# Patient Record
Sex: MALE
[De-identification: names, ages, dates, MRNs, and addresses within clinical notes are randomized per-mention and may not be internally consistent; named-entity substitution may affect disease eponyms.]

---

## 2017-07-25 ENCOUNTER — CHARTING TRANS (OUTPATIENT)
Dept: OTHER | Age: 22
End: 2017-07-25

## 2017-08-09 ENCOUNTER — IMAGING SERVICES (OUTPATIENT)
Dept: OTHER | Age: 22
End: 2017-08-09

## 2017-08-09 ENCOUNTER — LAB SERVICES (OUTPATIENT)
Dept: OTHER | Age: 22
End: 2017-08-09

## 2017-08-09 ENCOUNTER — CHARTING TRANS (OUTPATIENT)
Dept: GASTROENTEROLOGY | Age: 22
End: 2017-08-09

## 2017-08-10 LAB
GLIADIN PEPTIDE IGA SER IA-ACNC: 2.5 U/ML (ref 0–7)
GLIADIN PEPTIDE IGG SER IA-ACNC: <0.4 U/ML (ref 0–7)
TTG IGA SER IA-ACNC: 0.7 U/ML (ref 0–7)
TTG IGG SER IA-ACNC: <0.6 U/ML (ref 0–7)

## 2017-08-18 ENCOUNTER — CHARTING TRANS (OUTPATIENT)
Dept: OTHER | Age: 22
End: 2017-08-18

## 2018-11-28 VITALS
HEIGHT: 71 IN | DIASTOLIC BLOOD PRESSURE: 52 MMHG | HEART RATE: 52 BPM | WEIGHT: 169 LBS | SYSTOLIC BLOOD PRESSURE: 96 MMHG | RESPIRATION RATE: 14 BRPM | BODY MASS INDEX: 23.66 KG/M2

## 2019-03-22 ENCOUNTER — APPOINTMENT (OUTPATIENT)
Dept: OTHER | Facility: HOSPITAL | Age: 24
End: 2019-03-22
Attending: EMERGENCY MEDICINE

## 2023-06-08 ENCOUNTER — OFFICE VISIT (OUTPATIENT)
Dept: FAMILY MEDICINE CLINIC | Facility: CLINIC | Age: 28
End: 2023-06-08
Payer: COMMERCIAL

## 2023-06-08 VITALS
WEIGHT: 220 LBS | OXYGEN SATURATION: 98 % | HEART RATE: 85 BPM | SYSTOLIC BLOOD PRESSURE: 108 MMHG | RESPIRATION RATE: 16 BRPM | BODY MASS INDEX: 31.5 KG/M2 | HEIGHT: 70 IN | DIASTOLIC BLOOD PRESSURE: 68 MMHG

## 2023-06-08 DIAGNOSIS — Z00.00 WELLNESS EXAMINATION: Primary | ICD-10-CM

## 2023-06-08 DIAGNOSIS — Z00.00 LABORATORY EXAMINATION ORDERED AS PART OF A ROUTINE GENERAL MEDICAL EXAMINATION: ICD-10-CM

## 2023-06-08 DIAGNOSIS — K21.9 GASTROESOPHAGEAL REFLUX DISEASE, UNSPECIFIED WHETHER ESOPHAGITIS PRESENT: ICD-10-CM

## 2023-06-08 PROCEDURE — 3078F DIAST BP <80 MM HG: CPT | Performed by: FAMILY MEDICINE

## 2023-06-08 PROCEDURE — G0438 PPPS, INITIAL VISIT: HCPCS | Performed by: FAMILY MEDICINE

## 2023-06-08 PROCEDURE — 3074F SYST BP LT 130 MM HG: CPT | Performed by: FAMILY MEDICINE

## 2023-06-08 PROCEDURE — 99385 PREV VISIT NEW AGE 18-39: CPT | Performed by: FAMILY MEDICINE

## 2023-06-08 PROCEDURE — 99204 OFFICE O/P NEW MOD 45 MIN: CPT | Performed by: FAMILY MEDICINE

## 2023-06-08 PROCEDURE — 3008F BODY MASS INDEX DOCD: CPT | Performed by: FAMILY MEDICINE

## 2023-06-08 RX ORDER — FAMOTIDINE 20 MG/1
20 TABLET, FILM COATED ORAL 2 TIMES DAILY PRN
COMMUNITY

## 2023-06-08 RX ORDER — PANTOPRAZOLE SODIUM 40 MG/1
40 TABLET, DELAYED RELEASE ORAL
Qty: 30 TABLET | Refills: 2 | Status: SHIPPED | OUTPATIENT
Start: 2023-06-08

## 2023-06-10 ENCOUNTER — LAB ENCOUNTER (OUTPATIENT)
Dept: LAB | Age: 28
End: 2023-06-10
Attending: FAMILY MEDICINE
Payer: COMMERCIAL

## 2023-06-10 DIAGNOSIS — Z00.00 LABORATORY EXAMINATION ORDERED AS PART OF A ROUTINE GENERAL MEDICAL EXAMINATION: ICD-10-CM

## 2023-06-10 DIAGNOSIS — K21.9 GASTROESOPHAGEAL REFLUX DISEASE, UNSPECIFIED WHETHER ESOPHAGITIS PRESENT: ICD-10-CM

## 2023-06-10 LAB
ALBUMIN SERPL-MCNC: 4 G/DL (ref 3.4–5)
ALBUMIN/GLOB SERPL: 1.1 {RATIO} (ref 1–2)
ALP LIVER SERPL-CCNC: 91 U/L
ALT SERPL-CCNC: 45 U/L
ANION GAP SERPL CALC-SCNC: 4 MMOL/L (ref 0–18)
AST SERPL-CCNC: 23 U/L (ref 15–37)
BASOPHILS # BLD AUTO: 0.04 X10(3) UL (ref 0–0.2)
BASOPHILS NFR BLD AUTO: 0.5 %
BILIRUB SERPL-MCNC: 0.8 MG/DL (ref 0.1–2)
BUN BLD-MCNC: 15 MG/DL (ref 7–18)
CALCIUM BLD-MCNC: 9 MG/DL (ref 8.5–10.1)
CHLORIDE SERPL-SCNC: 106 MMOL/L (ref 98–112)
CHOLEST SERPL-MCNC: 181 MG/DL (ref ?–200)
CO2 SERPL-SCNC: 26 MMOL/L (ref 21–32)
CREAT BLD-MCNC: 1.1 MG/DL
EOSINOPHIL # BLD AUTO: 0.12 X10(3) UL (ref 0–0.7)
EOSINOPHIL NFR BLD AUTO: 1.6 %
ERYTHROCYTE [DISTWIDTH] IN BLOOD BY AUTOMATED COUNT: 12.4 %
FASTING PATIENT LIPID ANSWER: YES
FASTING STATUS PATIENT QL REPORTED: YES
GFR SERPLBLD BASED ON 1.73 SQ M-ARVRAT: 93 ML/MIN/1.73M2 (ref 60–?)
GLOBULIN PLAS-MCNC: 3.5 G/DL (ref 2.8–4.4)
GLUCOSE BLD-MCNC: 95 MG/DL (ref 70–99)
HCT VFR BLD AUTO: 43.6 %
HDLC SERPL-MCNC: 35 MG/DL (ref 40–59)
HGB BLD-MCNC: 13.9 G/DL
IMM GRANULOCYTES # BLD AUTO: 0.06 X10(3) UL (ref 0–1)
IMM GRANULOCYTES NFR BLD: 0.8 %
LDLC SERPL CALC-MCNC: 119 MG/DL (ref ?–100)
LYMPHOCYTES # BLD AUTO: 1.98 X10(3) UL (ref 1–4)
LYMPHOCYTES NFR BLD AUTO: 25.7 %
MCH RBC QN AUTO: 29 PG (ref 26–34)
MCHC RBC AUTO-ENTMCNC: 31.9 G/DL (ref 31–37)
MCV RBC AUTO: 90.8 FL
MONOCYTES # BLD AUTO: 0.81 X10(3) UL (ref 0.1–1)
MONOCYTES NFR BLD AUTO: 10.5 %
NEUTROPHILS # BLD AUTO: 4.7 X10 (3) UL (ref 1.5–7.7)
NEUTROPHILS # BLD AUTO: 4.7 X10(3) UL (ref 1.5–7.7)
NEUTROPHILS NFR BLD AUTO: 60.9 %
NONHDLC SERPL-MCNC: 146 MG/DL (ref ?–130)
OSMOLALITY SERPL CALC.SUM OF ELEC: 283 MOSM/KG (ref 275–295)
PLATELET # BLD AUTO: 280 10(3)UL (ref 150–450)
POTASSIUM SERPL-SCNC: 3.7 MMOL/L (ref 3.5–5.1)
PROT SERPL-MCNC: 7.5 G/DL (ref 6.4–8.2)
RBC # BLD AUTO: 4.8 X10(6)UL
SODIUM SERPL-SCNC: 136 MMOL/L (ref 136–145)
TRIGL SERPL-MCNC: 150 MG/DL (ref 30–149)
TSI SER-ACNC: 3.01 MIU/ML (ref 0.36–3.74)
VLDLC SERPL CALC-MCNC: 26 MG/DL (ref 0–30)
WBC # BLD AUTO: 7.7 X10(3) UL (ref 4–11)

## 2023-06-10 PROCEDURE — 80053 COMPREHEN METABOLIC PANEL: CPT

## 2023-06-10 PROCEDURE — 80061 LIPID PANEL: CPT

## 2023-06-10 PROCEDURE — 85025 COMPLETE CBC W/AUTO DIFF WBC: CPT

## 2023-06-10 PROCEDURE — 84443 ASSAY THYROID STIM HORMONE: CPT

## 2023-06-10 PROCEDURE — 87338 HPYLORI STOOL AG IA: CPT

## 2023-06-10 PROCEDURE — 36415 COLL VENOUS BLD VENIPUNCTURE: CPT

## 2023-06-13 LAB — H PYLORI AG STL QL IA: NEGATIVE

## 2023-07-17 ENCOUNTER — OFFICE VISIT (OUTPATIENT)
Dept: FAMILY MEDICINE CLINIC | Facility: CLINIC | Age: 28
End: 2023-07-17
Payer: COMMERCIAL

## 2023-07-17 VITALS
OXYGEN SATURATION: 97 % | HEIGHT: 70 IN | HEART RATE: 66 BPM | RESPIRATION RATE: 16 BRPM | DIASTOLIC BLOOD PRESSURE: 82 MMHG | WEIGHT: 223 LBS | BODY MASS INDEX: 31.92 KG/M2 | SYSTOLIC BLOOD PRESSURE: 114 MMHG

## 2023-07-17 DIAGNOSIS — E78.2 MIXED HYPERLIPIDEMIA: ICD-10-CM

## 2023-07-17 DIAGNOSIS — K21.9 GASTROESOPHAGEAL REFLUX DISEASE, UNSPECIFIED WHETHER ESOPHAGITIS PRESENT: Primary | ICD-10-CM

## 2023-07-17 PROCEDURE — 3008F BODY MASS INDEX DOCD: CPT | Performed by: FAMILY MEDICINE

## 2023-07-17 PROCEDURE — 3079F DIAST BP 80-89 MM HG: CPT | Performed by: FAMILY MEDICINE

## 2023-07-17 PROCEDURE — 3074F SYST BP LT 130 MM HG: CPT | Performed by: FAMILY MEDICINE

## 2023-07-17 PROCEDURE — 99213 OFFICE O/P EST LOW 20 MIN: CPT | Performed by: FAMILY MEDICINE

## 2023-07-17 RX ORDER — FLUTICASONE PROPIONATE 50 MCG
2 SPRAY, SUSPENSION (ML) NASAL DAILY
COMMUNITY

## 2023-08-10 ENCOUNTER — LAB ENCOUNTER (OUTPATIENT)
Dept: LAB | Age: 28
End: 2023-08-10
Attending: STUDENT IN AN ORGANIZED HEALTH CARE EDUCATION/TRAINING PROGRAM
Payer: COMMERCIAL

## 2023-08-10 DIAGNOSIS — K58.0 IRRITABLE BOWEL SYNDROME WITH DIARRHEA: ICD-10-CM

## 2023-08-10 DIAGNOSIS — K92.1 MELENA: ICD-10-CM

## 2023-08-10 LAB — IGA SERPL-MCNC: 162 MG/DL (ref 70–312)

## 2023-08-10 PROCEDURE — 82784 ASSAY IGA/IGD/IGG/IGM EACH: CPT

## 2023-08-10 PROCEDURE — 86364 TISS TRNSGLTMNASE EA IG CLAS: CPT

## 2023-08-10 PROCEDURE — 36415 COLL VENOUS BLD VENIPUNCTURE: CPT

## 2023-08-11 LAB — TTG IGA SER-ACNC: 0.9 U/ML (ref ?–7)

## 2023-08-12 ENCOUNTER — LAB ENCOUNTER (OUTPATIENT)
Dept: LAB | Age: 28
End: 2023-08-12
Attending: INTERNAL MEDICINE
Payer: COMMERCIAL

## 2023-08-12 DIAGNOSIS — K58.0 IRRITABLE BOWEL SYNDROME WITH DIARRHEA: ICD-10-CM

## 2023-08-12 DIAGNOSIS — K92.1 MELENA: ICD-10-CM

## 2023-08-12 LAB
CRYPTOSP AG STL QL IA: NEGATIVE
G LAMBLIA AG STL QL IA: NEGATIVE

## 2023-08-12 PROCEDURE — 87272 CRYPTOSPORIDIUM AG IF: CPT

## 2023-08-12 PROCEDURE — 87329 GIARDIA AG IA: CPT

## 2023-08-12 PROCEDURE — 87427 SHIGA-LIKE TOXIN AG IA: CPT

## 2023-08-12 PROCEDURE — 87046 STOOL CULTR AEROBIC BACT EA: CPT

## 2023-08-12 PROCEDURE — 87493 C DIFF AMPLIFIED PROBE: CPT

## 2023-08-12 PROCEDURE — 83993 ASSAY FOR CALPROTECTIN FECAL: CPT

## 2023-08-12 PROCEDURE — 87045 FECES CULTURE AEROBIC BACT: CPT

## 2023-08-13 LAB — C DIFF TOX B STL QL: NEGATIVE

## 2023-08-14 LAB — CALPROTECTIN STL-MCNT: 28.1 ΜG/G (ref ?–50)

## 2023-10-04 PROBLEM — K92.1 MELENA: Status: ACTIVE | Noted: 2023-10-04

## 2023-10-05 PROCEDURE — 88305 TISSUE EXAM BY PATHOLOGIST: CPT | Performed by: STUDENT IN AN ORGANIZED HEALTH CARE EDUCATION/TRAINING PROGRAM

## 2024-01-19 ENCOUNTER — OFFICE VISIT (OUTPATIENT)
Dept: FAMILY MEDICINE CLINIC | Facility: CLINIC | Age: 29
End: 2024-01-19
Payer: COMMERCIAL

## 2024-01-19 VITALS
RESPIRATION RATE: 19 BRPM | WEIGHT: 218.5 LBS | OXYGEN SATURATION: 98 % | BODY MASS INDEX: 31.28 KG/M2 | DIASTOLIC BLOOD PRESSURE: 78 MMHG | HEIGHT: 70 IN | SYSTOLIC BLOOD PRESSURE: 118 MMHG | HEART RATE: 74 BPM

## 2024-01-19 DIAGNOSIS — Z00.00 LABORATORY EXAM ORDERED AS PART OF ROUTINE GENERAL MEDICAL EXAMINATION: ICD-10-CM

## 2024-01-19 DIAGNOSIS — K21.9 GASTROESOPHAGEAL REFLUX DISEASE, UNSPECIFIED WHETHER ESOPHAGITIS PRESENT: Primary | ICD-10-CM

## 2024-01-19 PROCEDURE — 3008F BODY MASS INDEX DOCD: CPT | Performed by: FAMILY MEDICINE

## 2024-01-19 PROCEDURE — 3078F DIAST BP <80 MM HG: CPT | Performed by: FAMILY MEDICINE

## 2024-01-19 PROCEDURE — 99214 OFFICE O/P EST MOD 30 MIN: CPT | Performed by: FAMILY MEDICINE

## 2024-01-19 PROCEDURE — 3074F SYST BP LT 130 MM HG: CPT | Performed by: FAMILY MEDICINE

## 2024-01-19 RX ORDER — PANTOPRAZOLE SODIUM 40 MG/1
40 TABLET, DELAYED RELEASE ORAL
Qty: 90 TABLET | Refills: 3 | Status: SHIPPED | OUTPATIENT
Start: 2024-01-19

## 2024-01-19 NOTE — PROGRESS NOTES
[facility] Office Note  Chief Complaint:   Chief Complaint   Patient presents with    Follow - Up       HPI:   This is a 28 year old male coming in for    GERD f/u  -Doing well on pantoprazole.   -Has made dietary modifications which has helped.   -Denies any side effects with medication.   -No other new concerns/complaints.   -EGD in 10/23 normal, biopsy unremarkable.     Past Medical History:   Diagnosis Date    Allergic rhinitis 2018    Arthritis 2020    Back pain 2015    nothing severe    Black stools 8/7/2023    not often.    Bloating     Every now and than    Constipation     not often    Diarrhea, unspecified 8/7    not often    Esophageal reflux 2008    Acid Reflux has been an issue since high school, why i am here to visit    Fatigue     more tired now    Flatulence/gas pain/belching     often    Food intolerance     sensitive to Dairy    Heartburn 2007    took pantoprazole last 2 months, no heartburn since!    Indigestion     Irregular bowel habits     not often    Pain in joints 2018    use to lift weights    Sleep disturbance     Weight gain     gained 23 lbs,     Past Surgical History:   Procedure Laterality Date    TONSILLECTOMY  2017     Social History:  Social History     Socioeconomic History    Marital status:    Tobacco Use    Smoking status: Some Days     Types: Cigars     Passive exposure: Yes    Smokeless tobacco: Never    Tobacco comments:     Recreational Cigar, 1/every 2-3 months   Vaping Use    Vaping Use: Never used   Substance and Sexual Activity    Alcohol use: Yes     Alcohol/week: 1.0 standard drink of alcohol     Types: 1 Cans of beer per week     Comment: 1-2 drinks per month    Drug use: Not Currently     Types: Cannabis     Comment: experimental   Other Topics Concern    Caffeine Concern Yes    Exercise Yes     Comment: was very active and stopped 2 years ago    Seat Belt Yes    Special Diet No    Stress Concern Yes    Weight Concern Yes     Family  History:  No family history on file.  Allergies:  Allergies   Allergen Reactions    Seasonal OTHER (SEE COMMENTS)     Headaches / sinus congestion      Current Meds:  Current Outpatient Medications   Medication Sig Dispense Refill    pantoprazole 40 MG Oral Tab EC Take 1 tablet (40 mg total) by mouth before breakfast. 90 tablet 3    fluticasone propionate 50 MCG/ACT Nasal Suspension 2 sprays by Each Nare route as needed.        Ready to quit: Not Answered  Counseling given: Not Answered  Tobacco comments: Recreational Cigar, 1/every 2-3 months       REVIEW OF SYSTEMS:   Review of Systems   Constitutional: Negative.    HENT: Negative.     Eyes: Negative.    Respiratory: Negative.     Cardiovascular: Negative.    Gastrointestinal: Negative.    Endocrine: Negative.    Genitourinary: Negative.    Musculoskeletal: Negative.    Skin: Negative.    Neurological: Negative.    Psychiatric/Behavioral: Negative.          EXAM:   /78   Pulse 74   Resp 19   Ht 5' 10\" (1.778 m)   Wt 218 lb 8 oz (99.1 kg)   SpO2 98%   BMI 31.35 kg/m²  Estimated body mass index is 31.35 kg/m² as calculated from the following:    Height as of this encounter: 5' 10\" (1.778 m).    Weight as of this encounter: 218 lb 8 oz (99.1 kg).   Vital signs reviewed.Appears stated age, well groomed.  Physical Exam  Vitals and nursing note reviewed.   Constitutional:       Appearance: Normal appearance.   HENT:      Head: Normocephalic and atraumatic.   Cardiovascular:      Rate and Rhythm: Normal rate and regular rhythm.      Pulses: Normal pulses.      Heart sounds: Normal heart sounds. No murmur heard.  Pulmonary:      Effort: Pulmonary effort is normal. No respiratory distress.      Breath sounds: Normal breath sounds. No wheezing.   Neurological:      Mental Status: He is alert and oriented to person, place, and time.   Psychiatric:         Mood and Affect: Mood normal.          ASSESSMENT AND PLAN:   1. Gastroesophageal reflux disease, unspecified  whether esophagitis present  Excellent control, CPM. Continue dietary modification. F/u 6mo for annual physical.   - pantoprazole 40 MG Oral Tab EC; Take 1 tablet (40 mg total) by mouth before breakfast.  Dispense: 90 tablet; Refill: 3    2. Laboratory exam ordered as part of routine general medical examination  - CBC With Differential With Platelet; Future  - Comp Metabolic Panel (14); Future  - Lipid Panel; Future  - TSH W Reflex To Free T4; Future      Meds & Refills for this Visit:  Requested Prescriptions     Signed Prescriptions Disp Refills    pantoprazole 40 MG Oral Tab EC 90 tablet 3     Sig: Take 1 tablet (40 mg total) by mouth before breakfast.       Health Maintenance:  Health Maintenance Due   Topic Date Due    COVID-19 Vaccine (4 - 2023-24 season) 09/01/2023    Influenza Vaccine (1) Never done    Annual Depression Screening  01/01/2024       Patient/Caregiver Education: Patient/Caregiver Education: There are no barriers to learning. Medical education done.   Outcome: Patient verbalizes understanding. Patient is notified to call with any questions, complications, allergies, or worsening or changing symptoms.  Patient is to call with any side effects or complications from the treatments as a result of today.     Problem List:  Patient Active Problem List   Diagnosis    Gastroesophageal reflux disease    Mixed hyperlipidemia    Closed fracture of ankle    Melena, blood in stool

## 2024-02-21 ENCOUNTER — E-VISIT (OUTPATIENT)
Dept: FAMILY MEDICINE CLINIC | Facility: CLINIC | Age: 29
End: 2024-02-21
Payer: COMMERCIAL

## 2024-02-21 DIAGNOSIS — J32.9 SINUSITIS, UNSPECIFIED CHRONICITY, UNSPECIFIED LOCATION: Primary | ICD-10-CM

## 2024-02-21 RX ORDER — AZITHROMYCIN 250 MG/1
TABLET, FILM COATED ORAL
Qty: 6 TABLET | Refills: 0 | Status: SHIPPED | OUTPATIENT
Start: 2024-02-21 | End: 2024-02-25

## 2024-02-21 NOTE — PROGRESS NOTES
Je Escalante Jr. is a 28 year old male submitting an E-Visit questionnaire:  No chief complaint on file.      Subjective:   HPI     Mychart E-Visit Sinus 1       Question 2/21/2024  8:49 AM CST - Filed by Patient    Which of the following have you been experiencing? Congested nose     Cough     Headache     Pain around the nose and face     Sneezing    Have you had any of the following? None of the above (Difficulty: Swallowing/Breathing/Seeing: Blurry/Double)    Has the patient tested for COVID-19 since the on-set of these symptoms? No    Has the patient taken any over the counter medicines or home remedies to help the symptoms?  Yes    If yes, please list all of the medications and home remedies the patient has tried.  sudafed, vicks nasal spray    Is the patient up to date on their COVID-19 booster shot / vaccines?  Yes    Has the patient been exposed to anyone with Covid within the last week? No / Unsure    Are your symptoms improving, worsening, or unchanged since onset? Improving            History/Other:   HISTORY REVIEWED    No Further Nursing Notes to Review           Assessment & Plan:   ASSESSMENT AND PLAN   No orders of the defined types were placed in this encounter.    Encounter Diagnosis   Name Primary?    Sinusitis, unspecified chronicity, unspecified location Yes     Meds & Refills for this Visit:  Requested Prescriptions     Signed Prescriptions Disp Refills    azithromycin 250 MG Oral Tab 6 tablet 0     Sig: Take 2 tablets (500 mg total) by mouth daily for 1 day, THEN 1 tablet (250 mg total) daily for 4 days.       TIME AND CHARGES

## 2024-03-20 ENCOUNTER — TELEMEDICINE (OUTPATIENT)
Dept: FAMILY MEDICINE CLINIC | Facility: CLINIC | Age: 29
End: 2024-03-20
Payer: COMMERCIAL

## 2024-03-20 DIAGNOSIS — K21.9 GASTROESOPHAGEAL REFLUX DISEASE, UNSPECIFIED WHETHER ESOPHAGITIS PRESENT: ICD-10-CM

## 2024-03-20 DIAGNOSIS — J01.11 ACUTE RECURRENT FRONTAL SINUSITIS: Primary | ICD-10-CM

## 2024-03-20 PROCEDURE — 99214 OFFICE O/P EST MOD 30 MIN: CPT | Performed by: FAMILY MEDICINE

## 2024-03-20 RX ORDER — AMOXICILLIN AND CLAVULANATE POTASSIUM 875; 125 MG/1; MG/1
1 TABLET, FILM COATED ORAL 2 TIMES DAILY
Qty: 20 TABLET | Refills: 0 | Status: SHIPPED | OUTPATIENT
Start: 2024-03-20 | End: 2024-03-30

## 2024-03-20 RX ORDER — PANTOPRAZOLE SODIUM 40 MG/1
40 TABLET, DELAYED RELEASE ORAL
Qty: 90 TABLET | Refills: 3 | Status: SHIPPED | OUTPATIENT
Start: 2024-03-20

## 2024-03-20 NOTE — PROGRESS NOTES
Virtual Audio/Video Check-In    Je Escalante Jr. verbally consents to a Virtual/Video Check-In visit on 03/20/24.  Patient has been referred to the [facility] website at [email or web address] to review the yearly Consent to Treat document.    Patient understands and accepts financial responsibility for any deductible, co-insurance and/or co-pays associated with this service.    Duration of the service: 7 minutes      Summary of topics discussed:     HPI:  Had evisit last month for sinusitis. Started on zpak; did report improvement in sx but they never really went away. This last week sx have been worse Reports sinus pressure at forehead, sinus congestion, rhinorrhea, PND. Has been using flonase, otc guaifenesin w/ minimal improvement. No fevers. No cough, dyspnea, wheezing. Has difficulty sleeping d/t congestion, pain.        ROS: pertinent positives and negatives as per HPI     PE:   Gen: AAOx3  Head: NC/AT  Eyes: conjunctiva normal  Nose: No discharge visualized   Resp: breathing well on exam, normal effort. Able to speak full sentences without any SOB  Skin: no rashes, no lesions, no cyanosis  Psych: normal affect       Diagnoses and all orders for this visit:    Acute recurrent frontal sinusitis  Recurrent sinusitis - switch to augmentin. Advised on starting antihistamine/decongestant, nasal saline spray. Can continue flonase. Reviewed return precautions. Consider ENT referral if no improvement.  F/u 1wk PRN.   -     amoxicillin clavulanate 875-125 MG Oral Tab; Take 1 tablet by mouth 2 (two) times daily for 10 days.    Gastroesophageal reflux disease, unspecified whether esophagitis present  Stable, CPM.         Nuno Velásquez MD

## 2024-07-12 ENCOUNTER — OFFICE VISIT (OUTPATIENT)
Dept: FAMILY MEDICINE CLINIC | Facility: CLINIC | Age: 29
End: 2024-07-12
Payer: COMMERCIAL

## 2024-07-12 VITALS
DIASTOLIC BLOOD PRESSURE: 78 MMHG | OXYGEN SATURATION: 98 % | WEIGHT: 219 LBS | HEIGHT: 71 IN | BODY MASS INDEX: 30.66 KG/M2 | SYSTOLIC BLOOD PRESSURE: 120 MMHG | HEART RATE: 76 BPM

## 2024-07-12 DIAGNOSIS — R22.40 NODULE OF SKIN OF FOOT: ICD-10-CM

## 2024-07-12 DIAGNOSIS — J35.8 TONSILLAR EXUDATE: ICD-10-CM

## 2024-07-12 DIAGNOSIS — K21.9 GASTROESOPHAGEAL REFLUX DISEASE, UNSPECIFIED WHETHER ESOPHAGITIS PRESENT: ICD-10-CM

## 2024-07-12 DIAGNOSIS — Z00.00 WELLNESS EXAMINATION: Primary | ICD-10-CM

## 2024-07-12 PROBLEM — K92.1 MELENA: Status: RESOLVED | Noted: 2023-10-04 | Resolved: 2024-07-12

## 2024-07-12 LAB
CONTROL LINE PRESENT WITH A CLEAR BACKGROUND (YES/NO): YES YES
KIT LOT #: 1242 NUMERIC (ref 1242–?)

## 2024-07-12 RX ORDER — PANTOPRAZOLE SODIUM 40 MG/1
40 TABLET, DELAYED RELEASE ORAL
Qty: 90 TABLET | Refills: 3 | Status: SHIPPED | OUTPATIENT
Start: 2024-07-12

## 2024-07-12 NOTE — PATIENT INSTRUCTIONS
Health Screening Guidelines, Men Ages 18 to 39   Screening tests and health counseling are a key part of managing your health. A screening test is done to find disorders or diseases in people who don't have any symptoms. Screening tests are not used to diagnose. They are used to find out if more testing is needed. The goal may be to find a disease early so it can be treated with more success. Or the goal may be to find a disease early so you can make lifestyle changes. You may need regular checkups to help you reduce your risk of disease.   Below are guidelines for men ages 18 to 39. Talk with your healthcare provider. Make sure you’re up-to-date on what you need.   We understand gender is a spectrum. We may use gendered terms to talk about anatomy and health risk. Please use this information in a way that works best for you and your provider as you talk about your care.   Screening  Who needs it  How often    Alcohol misuse All men in this age group  At routine exams   Blood pressure All men in this age group  Once a year if your blood pressure is normal. Normal blood pressure is less than 120/80 mm Hg. If your blood pressure is higher than this, follow the advice of your healthcare provider.    Prediabetes and type 2 diabetes  Men ages 35 to 70 who are overweight or obese  At least every 3 years (yearly if blood sugar has already started to rise)    Hepatitis C All men ages 18 to 79  At routine exams   High cholesterol or triglycerides  All men ages 20 and older, and younger men at high risk for coronary artery disease.  At least every 5 years    HIV All men At routine exams   Obesity All men in this age group  At routine exams   Syphilis Men at higher risk for infection. Talk with your healthcare provider.  At routine exams   Tuberculosis Men at higher risk for infection. Talk with your healthcare provider.  Ask your healthcare provider    Vision All men in this age group  Every 5 to 10 years if no risk factors  for eye disease    Health counseling  Who needs it  How often    Diet and exercise All men in this age group  At routine exams   Use of tobacco and the health effects it can cause  All men in this age group  Every visit   Sexually transmitted infection (STI) prevention  Men who are sexually active  At routine exams   Skin cancer All men in this age group  At routine exams. You may be reminded to avoid outdoor tanning and tanning beds.    [employer] last reviewed this educational content on 7/1/2022 © 2000-2024 [employer]. All rights reserved. This information is not intended as a substitute for professional medical care. Always follow your healthcare professional's instructions.

## 2024-07-12 NOTE — PROGRESS NOTES
HPI:   Je Escalante Jr. is a 29 year old male who presents for an Annual Health Visit.     GERD - stable on pantoprazole. No new concerns.     Allergies:     Allergies   Allergen Reactions    Seasonal OTHER (SEE COMMENTS)     Headaches / sinus congestion        CURRENT MEDICATIONS   Current Outpatient Medications   Medication Sig Dispense Refill    pantoprazole 40 MG Oral Tab EC Take 1 tablet (40 mg total) by mouth before breakfast. 90 tablet 3      HISTORICAL INFORMATION   Past Medical History:    Allergic rhinitis    Arthritis    Back pain    nothing severe    Black stools    not often.    Bloating    Every now and than    Constipation    not often    Diarrhea, unspecified    not often    Esophageal reflux    Acid Reflux has been an issue since high school, why i am here to visit    Fatigue    more tired now    Flatulence/gas pain/belching    often    Food intolerance    sensitive to Dairy    Heartburn    took pantoprazole last 2 months, no heartburn since!    Indigestion    Irregular bowel habits    not often    Pain in joints    use to lift weights    Sleep disturbance    Weight gain    gained 23 lbs,      Past Surgical History:   Procedure Laterality Date    Tonsillectomy  2017      History reviewed. No pertinent family history.   SOCIAL HISTORY   Social History     Socioeconomic History    Marital status:    Tobacco Use    Smoking status: Some Days     Types: Cigars     Passive exposure: Yes    Smokeless tobacco: Never    Tobacco comments:     Recreational Cigar, 1/every 2-3 months   Vaping Use    Vaping status: Never Used   Substance and Sexual Activity    Alcohol use: Yes     Alcohol/week: 1.0 standard drink of alcohol     Types: 1 Cans of beer per week     Comment: not as much as in college. minimal now    Drug use: Not Currently     Types: Cannabis     Comment: experimental   Other Topics Concern    Caffeine Concern Yes    Exercise Yes     Comment: was very active and stopped 2 years ago     Seat Belt Yes    Special Diet No    Stress Concern Yes    Weight Concern Yes     Social History     Social History Narrative    Not on file        REVIEW OF SYSTEMS:     Constitutional: negative  Eyes: negative  ENT: negative  Respiratory: negative  Cardiovascular: negative  Gastrointestinal: negative  Integument/Breast: negative  Genitourinary: negative  Heme/Lymph: negative  Musculoskeletal: negative  Neurological: negative  Psych: negative  Endocrine: negative  Allergic/Immune: negative    EXAM:   /78   Pulse 76   Ht 5' 11\" (1.803 m)   Wt 219 lb (99.3 kg)   SpO2 98%   BMI 30.54 kg/m²    Wt Readings from Last 6 Encounters:   07/12/24 219 lb (99.3 kg)   01/19/24 218 lb 8 oz (99.1 kg)   09/12/23 185 lb (83.9 kg)   08/10/23 221 lb 6.4 oz (100.4 kg)   07/17/23 223 lb (101.2 kg)   06/08/23 220 lb (99.8 kg)     Body mass index is 30.54 kg/m².    General: alert, appears stated age, and cooperative  Head: Normocephalic, without obvious abnormality, atraumatic  Eyes: negative  Ears: normal TM's and external ear canals both ears  Nose: Nares normal. Septum midline. Mucosa normal. No drainage or sinus tenderness.  Throat:  tonsillar exudate on L tonsil. No erythema. Oropharynx otherwise wnl  Neck: no adenopathy, supple, symmetrical, trachea midline, and thyroid not enlarged, symmetric, no tenderness/mass/nodules  Heart: S1, S2 normal, no murmur, click, rub or gallop, regular rate and rhythm  Lungs: clear to auscultation bilaterally  Chest wall: no tenderness  Abdomen: soft, non-tender; bowel sounds normal; no masses,  no organomegaly  : deferred  Back: negative  Extremities:  R foot soft mass ~2cm  Pulses: 2+ and symmetric  Skin: Skin color, texture, turgor normal. No rashes or lesions  Lymph Nodes:  No LAD  Neurologic: Grossly normal    ASSESSMENT AND PLAN:   Je was seen today for physical.    Diagnoses and all orders for this visit:    Wellness examination  -Immunizations: UTD  -Metabolic: BMI 30. BP  wnl. Due for annual labs   -Cancer screening: none indicated   -Communicable disease: low risk   -Mental health: no concerns   -Other preventative: follow with dentistry and optometry.   -Lifestyle: Follow a well balanced healthy diet with emphasis on fruits, vegetables, whole grains, lean meats. Limit processed and junk foods. Aim for at least 150 minutes of moderate intensity exercise weekly. Make sure you are staying adequately hydrated. Aim to get 7-9 hours of sleep nightly.     Gastroesophageal reflux disease, unspecified whether esophagitis present  Stable ,CPM  -     pantoprazole 40 MG Oral Tab EC; Take 1 tablet (40 mg total) by mouth before breakfast.    Nodule of skin of foot  Suspect ganglion cyst, refer to podiatry.   -     Podiatry Referral - In Network    Tonsillar exudate  Rapid strep negative. Likely viral pharyngitis. Counseled on supportive care measures. F/u PRN.   -     Rapid Strep      Patient Instructions     Health Screening Guidelines, Men Ages 18 to 39   Screening tests and health counseling are a key part of managing your health. A screening test is done to find disorders or diseases in people who don't have any symptoms. Screening tests are not used to diagnose. They are used to find out if more testing is needed. The goal may be to find a disease early so it can be treated with more success. Or the goal may be to find a disease early so you can make lifestyle changes. You may need regular checkups to help you reduce your risk of disease.   Below are guidelines for men ages 18 to 39. Talk with your healthcare provider. Make sure you’re up-to-date on what you need.   We understand gender is a spectrum. We may use gendered terms to talk about anatomy and health risk. Please use this information in a way that works best for you and your provider as you talk about your care.   Screening  Who needs it  How often    Alcohol misuse All men in this age group  At routine exams   Blood pressure All  men in this age group  Once a year if your blood pressure is normal. Normal blood pressure is less than 120/80 mm Hg. If your blood pressure is higher than this, follow the advice of your healthcare provider.    Prediabetes and type 2 diabetes  Men ages 35 to 70 who are overweight or obese  At least every 3 years (yearly if blood sugar has already started to rise)    Hepatitis C All men ages 18 to 79  At routine exams   High cholesterol or triglycerides  All men ages 20 and older, and younger men at high risk for coronary artery disease.  At least every 5 years    HIV All men At routine exams   Obesity All men in this age group  At routine exams   Syphilis Men at higher risk for infection. Talk with your healthcare provider.  At routine exams   Tuberculosis Men at higher risk for infection. Talk with your healthcare provider.  Ask your healthcare provider    Vision All men in this age group  Every 5 to 10 years if no risk factors for eye disease    Health counseling  Who needs it  How often    Diet and exercise All men in this age group  At routine exams   Use of tobacco and the health effects it can cause  All men in this age group  Every visit   Sexually transmitted infection (STI) prevention  Men who are sexually active  At routine exams   Skin cancer All men in this age group  At routine exams. You may be reminded to avoid outdoor tanning and tanning beds.    E[employer] last reviewed this educational content on 7/1/2022 © 2000-2024 [employer]. All rights reserved. This information is not intended as a substitute for professional medical care. Always follow your healthcare professional's instructions.          The patient indicates understanding of these issues and agrees to the plan.    Problem List:  Patient Active Problem List   Diagnosis    Gastroesophageal reflux disease    Mixed hyperlipidemia    Closed fracture of ankle       Nuno Velásquez MD  7/12/2024  2:33 PM

## 2024-07-20 ENCOUNTER — LAB ENCOUNTER (OUTPATIENT)
Dept: LAB | Age: 29
End: 2024-07-20
Attending: FAMILY MEDICINE
Payer: COMMERCIAL

## 2024-07-20 DIAGNOSIS — Z00.00 LABORATORY EXAM ORDERED AS PART OF ROUTINE GENERAL MEDICAL EXAMINATION: ICD-10-CM

## 2024-07-20 LAB
ALBUMIN SERPL-MCNC: 4.5 G/DL (ref 3.2–4.8)
ALBUMIN/GLOB SERPL: 1.7 {RATIO} (ref 1–2)
ALP LIVER SERPL-CCNC: 83 U/L
ALT SERPL-CCNC: 40 U/L
ANION GAP SERPL CALC-SCNC: 6 MMOL/L (ref 0–18)
AST SERPL-CCNC: 28 U/L (ref ?–34)
BASOPHILS # BLD AUTO: 0.03 X10(3) UL (ref 0–0.2)
BASOPHILS NFR BLD AUTO: 0.4 %
BILIRUB SERPL-MCNC: 1.2 MG/DL (ref 0.3–1.2)
BUN BLD-MCNC: 14 MG/DL (ref 9–23)
CALCIUM BLD-MCNC: 9.3 MG/DL (ref 8.7–10.4)
CHLORIDE SERPL-SCNC: 106 MMOL/L (ref 98–112)
CHOLEST SERPL-MCNC: 184 MG/DL (ref ?–200)
CO2 SERPL-SCNC: 28 MMOL/L (ref 21–32)
CREAT BLD-MCNC: 0.81 MG/DL
EGFRCR SERPLBLD CKD-EPI 2021: 121 ML/MIN/1.73M2 (ref 60–?)
EOSINOPHIL # BLD AUTO: 0.1 X10(3) UL (ref 0–0.7)
EOSINOPHIL NFR BLD AUTO: 1.3 %
ERYTHROCYTE [DISTWIDTH] IN BLOOD BY AUTOMATED COUNT: 12.6 %
FASTING PATIENT LIPID ANSWER: YES
FASTING STATUS PATIENT QL REPORTED: YES
GLOBULIN PLAS-MCNC: 2.7 G/DL (ref 2.8–4.4)
GLUCOSE BLD-MCNC: 81 MG/DL (ref 70–99)
HCT VFR BLD AUTO: 44.7 %
HDLC SERPL-MCNC: 35 MG/DL (ref 40–59)
HGB BLD-MCNC: 14.1 G/DL
IMM GRANULOCYTES # BLD AUTO: 0.06 X10(3) UL (ref 0–1)
IMM GRANULOCYTES NFR BLD: 0.8 %
LDLC SERPL CALC-MCNC: 120 MG/DL (ref ?–100)
LYMPHOCYTES # BLD AUTO: 1.75 X10(3) UL (ref 1–4)
LYMPHOCYTES NFR BLD AUTO: 23.4 %
MCH RBC QN AUTO: 28.4 PG (ref 26–34)
MCHC RBC AUTO-ENTMCNC: 31.5 G/DL (ref 31–37)
MCV RBC AUTO: 89.9 FL
MONOCYTES # BLD AUTO: 0.79 X10(3) UL (ref 0.1–1)
MONOCYTES NFR BLD AUTO: 10.6 %
NEUTROPHILS # BLD AUTO: 4.74 X10 (3) UL (ref 1.5–7.7)
NEUTROPHILS # BLD AUTO: 4.74 X10(3) UL (ref 1.5–7.7)
NEUTROPHILS NFR BLD AUTO: 63.5 %
NONHDLC SERPL-MCNC: 149 MG/DL (ref ?–130)
OSMOLALITY SERPL CALC.SUM OF ELEC: 290 MOSM/KG (ref 275–295)
PLATELET # BLD AUTO: 258 10(3)UL (ref 150–450)
POTASSIUM SERPL-SCNC: 4 MMOL/L (ref 3.5–5.1)
PROT SERPL-MCNC: 7.2 G/DL (ref 5.7–8.2)
RBC # BLD AUTO: 4.97 X10(6)UL
SODIUM SERPL-SCNC: 140 MMOL/L (ref 136–145)
TRIGL SERPL-MCNC: 163 MG/DL (ref 30–149)
TSI SER-ACNC: 2.57 MIU/ML (ref 0.55–4.78)
VLDLC SERPL CALC-MCNC: 29 MG/DL (ref 0–30)
WBC # BLD AUTO: 7.5 X10(3) UL (ref 4–11)

## 2024-07-20 PROCEDURE — 80061 LIPID PANEL: CPT

## 2024-07-20 PROCEDURE — 84443 ASSAY THYROID STIM HORMONE: CPT

## 2024-07-20 PROCEDURE — 85025 COMPLETE CBC W/AUTO DIFF WBC: CPT

## 2024-07-20 PROCEDURE — 36415 COLL VENOUS BLD VENIPUNCTURE: CPT

## 2024-07-20 PROCEDURE — 80053 COMPREHEN METABOLIC PANEL: CPT

## 2024-11-21 ENCOUNTER — TELEMEDICINE (OUTPATIENT)
Dept: FAMILY MEDICINE CLINIC | Facility: CLINIC | Age: 29
End: 2024-11-21
Payer: COMMERCIAL

## 2024-11-21 DIAGNOSIS — R05.2 SUBACUTE COUGH: Primary | ICD-10-CM

## 2024-11-21 PROCEDURE — 99214 OFFICE O/P EST MOD 30 MIN: CPT | Performed by: FAMILY MEDICINE

## 2024-11-21 RX ORDER — CODEINE PHOSPHATE AND GUAIFENESIN 10; 100 MG/5ML; MG/5ML
10 SOLUTION ORAL EVERY 6 HOURS PRN
Qty: 280 ML | Refills: 0 | Status: SHIPPED | OUTPATIENT
Start: 2024-11-21

## 2024-11-21 RX ORDER — AZITHROMYCIN 250 MG/1
TABLET, FILM COATED ORAL
Qty: 6 TABLET | Refills: 0 | Status: SHIPPED | OUTPATIENT
Start: 2024-11-21 | End: 2024-11-26

## 2024-11-21 NOTE — PROGRESS NOTES
Virtual Video Check-In    Je Escalante Jr. verbally consents to a Virtual/Telephone Check-In visit on 11/21/24.  Patient has been referred to the [facility] website at [email or web address] to review the yearly Consent to Treat document.    Patient understands and accepts financial responsibility for any deductible, co-insurance and/or co-pays associated with this service.    Duration of the service: 10 minutes      Summary of topics discussed:     HPI:  3 week history of dry cough.   Reports no fevers, congestion, rhinorrhea, post nasal drip.   Feels more diaphoretic.   Reports no sick contacts.   Did try flonase, cough drops, nasal saline spray. Has tried dayquil/nyquil.   Reports globus sensation, sore throat.   No shortness of breath, wheezing.   No sinus pressure   No ear pain.     ROS: pertinent positives and negatives as per HPI     PE:   Gen: AAOx3  Head: NC/AT  Eyes: conjunctiva normal  Nose: No discharge visualized   Resp: audible cough, breathing well on exam, normal effort. Able to speak full sentences without any SOB  Skin: no rashes, no lesions, no cyanosis  Psych: normal affect       Diagnoses and all orders for this visit:    Subacute cough  -     azithromycin (ZITHROMAX Z-PHI) 250 MG Oral Tab; Take 2 tablets (500 mg total) by mouth daily for 1 day, THEN 1 tablet (250 mg total) daily for 4 days.  -     guaiFENesin-codeine 100-10 MG/5ML Oral Solution; Take 10 mL by mouth every 6 (six) hours as needed for cough.    Given duration of symptoms and severity of cough will start empiric abx coverage. Will start zpak. Will provide with cheratussin as he has not had much relief with OTC cough/cold meds. Advised to continue supportive care measures. Continue to push fluids, humidifer therapy, nasal saline rinses. Can do salt water gargles, honey prn sore throat. Reviewed return/call back precautions. Advised to send us condition update within next few days. F/u 1wk PRN      Nuno Velásquez MD

## 2025-06-15 DIAGNOSIS — K21.9 GASTROESOPHAGEAL REFLUX DISEASE, UNSPECIFIED WHETHER ESOPHAGITIS PRESENT: ICD-10-CM

## 2025-06-17 RX ORDER — PANTOPRAZOLE SODIUM 40 MG/1
40 TABLET, DELAYED RELEASE ORAL
Qty: 90 TABLET | Refills: 3 | OUTPATIENT
Start: 2025-06-17

## 2025-06-17 NOTE — TELEPHONE ENCOUNTER
Refills remain at [facility].   Prescription sent 7/12/24 for #90 +3 refills   Called [facility] to confirm prescription refills are available to use. They will get ready for patient today.  And will be able to fill again in 3 months, before prescription expires 10/8/25.    Outpatient Medication Detail   Disp Refills Start End    pantoprazole 40 MG Oral Tab EC 90 tablet 3 7/12/2024 --    Sig - Route: Take 1 tablet (40 mg total) by mouth before breakfast. - Oral    Sent to pharmacy as: Pantoprazole Sodium 40 MG Oral Tablet Delayed Release (Protonix)    E-Prescribing Status: Receipt confirmed by pharmacy (7/12/2024  2:35 PM CDT)    Associated Diagnoses  Gastroesophageal reflux disease, unspecified whether esophagitis present      Pharmacy  [id number] [address] [facility] [address] [phone], [phone]